# Patient Record
(demographics unavailable — no encounter records)

---

## 2024-11-06 NOTE — HISTORY OF PRESENT ILLNESS
[FreeTextEntry1] : I saw this patient in the office today.  As you recall, he is status post injury on 2/4/21. He was working and had parked his car in the parking lot. He was walking toward the door when he slipped on ice and hit the front of his head. He also injured his left arm.  He was ultimately taken to [facility] (formally known as [place]) where he was evaluated and ultimately discharge. Ever since then he has had headaches. He also has balance difficulty. He now reports that the headaches are occurring about twice per week. The balance difficulty is constant. He had been doing physical therapy twice per week.  He returned to work on 7/20/21.  11/6/2024 visit: He continues to have headaches on the order of every other day. He also reports that he continues to have difficulty with his left arm.

## 2024-11-06 NOTE — DATA REVIEWED
[de-identified] : Brain MRI was performed on 7/17/2022 at [facility].\par  The study was unremarkable. [de-identified] : CT scan of the head was performed at [facility] on 2/4/21. The study demonstrated some soft tissue contusion with moderate soft tissue scalp hematoma in the right frontal region with no underlying fracture or intracranial hemorrhage.  Cervical spine CT was performed on the same day at the same institution. The study demonstrated no sign of fracture or traumatic malalignment. There was some degenerative disc disease noted.  Thoracic and lumbar spine CT was performed on the same date at the same institution. Again no acute fractures were seen. 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 There was some chronic degenerative change.  EMG and nerve conduction studies of the left upper extremity were performed on 2/7/2023. The study demonstrated left C8 radiculopathy.   There was no evidence of median or ulnar nerve entrapment/compression.

## 2024-11-06 NOTE — CONSULT LETTER
[Dear  ___] : Dear  [unfilled], [Courtesy Letter:] : I had the pleasure of seeing your patient, [unfilled], in my office today. [Please see my note below.] : Please see my note below. [Consult Closing:] : Thank you very much for allowing me to participate in the care of this patient.  If you have any questions, please do not hesitate to contact me. [Sincerely,] : Sincerely, [FreeTextEntry3] : Toby Nelson MD.

## 2024-11-06 NOTE — ASSESSMENT
[FreeTextEntry1] : This is a 62-year-old man who is status post injury on 2/4/21. He sustained a head injury and has had residual headache and balance difficulty. Brain imaging was unremarkable. He was having some positional vertigo that improved. In addition, he feels a "bump" in the area where he had the previous hematoma. I had explained that there are no medications that improve balance. I had given him an instruction sheet for some simple exercises he can do at home.  EMG and nerve conduction studies of the left upper extremity were performed on 2/7/2023. The study demonstrated left C8 radiculopathy.  There was no evidence of median or ulnar nerve entrapment/compression. He had been following with an orthopedist. He reports that he completed physical therapy.   My feeling is that the residual symptoms from his head (headache and dizziness) are permanent. Given the motor weakness and sensory loss in the left upper extremity, I would estimate the left elbow injury has resulted in 40% permanent damage. It is also my feeling that his left arm difficulties will be permanent.  Given the persisting symptoms in the left arm, I would recommend he follow-up with the orthopedist again. I would defer comment regarding the neck and back to his orthopedist.

## 2024-11-06 NOTE — REASON FOR VISIT
[Follow-Up: _____] : a [unfilled] follow-up visit [FreeTextEntry1] : CC: headache and balance difficulty after injury

## 2024-11-06 NOTE — PHYSICAL EXAM
[General Appearance - Alert] : alert [General Appearance - In No Acute Distress] : in no acute distress [Oriented To Time, Place, And Person] : oriented to person, place, and time [Affect] : the affect was normal [Memory Recent] : recent memory was not impaired [Memory Remote] : remote memory was not impaired [Cranial Nerves Optic (II)] : visual acuity intact bilaterally,  visual fields full to confrontation, pupils equal round and reactive to light [Cranial Nerves Oculomotor (III)] : extraocular motion intact [Cranial Nerves Trigeminal (V)] : facial sensation intact symmetrically [Cranial Nerves Facial (VII)] : face symmetrical [Cranial Nerves Vestibulocochlear (VIII)] : hearing was intact bilaterally [Cranial Nerves Glossopharyngeal (IX)] : tongue and palate midline [Cranial Nerves Accessory (XI - Cranial And Spinal)] : head turning and shoulder shrug symmetric [Cranial Nerves Hypoglossal (XII)] : there was no tongue deviation with protrusion [Motor Tone] : muscle tone was normal in all four extremities [Sensation Tactile Decrease] : light touch was intact [Sensation Vibration Decrease] : vibration was intact [Abnormal Walk] : normal gait [Tremor] : a tremor present [2+] : Patella left 2+ [Optic Disc Abnormality] : the optic disc were normal in size and color [Dysarthria] : no dysarthria [Aphasia] : no dysphasia/aphasia [Romberg's Sign] : Romberg's sign was negtive [Coordination - Dysmetria Impaired Finger-to-Nose Bilateral] : not present [Plantar Reflex Right Only] : normal on the right [Plantar Reflex Left Only] : normal on the left [FreeTextEntry6] : The left arm is somewhat limited by pain.

## 2025-05-09 NOTE — DATA REVIEWED
[de-identified] : Brain MRI was performed on 7/17/2022 at [facility].\par  The study was unremarkable. [de-identified] : CT scan of the head was performed at [facility] on 2/4/21. The study demonstrated some soft tissue contusion with moderate soft tissue scalp hematoma in the right frontal region with no underlying fracture or intracranial hemorrhage.  Cervical spine CT was performed on the same day at the same institution. The study demonstrated no sign of fracture or traumatic malalignment. There was some degenerative disc disease noted.  Thoracic and lumbar spine CT was performed on the same date at the same institution. Again no acute fractures were seen. 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 There was some chronic degenerative change.  EMG and nerve conduction studies of the left upper extremity were performed on 2/7/2023. The study demonstrated left C8 radiculopathy.   There was no evidence of median or ulnar nerve entrapment/compression.

## 2025-05-09 NOTE — ASSESSMENT
[FreeTextEntry1] : This is a 62-year-old man who is status post injury on 2/4/21. He sustained a head injury and has had residual headache and balance difficulty. Brain imaging was unremarkable. He was having some positional vertigo that improved. In addition, he feels a "bump" in the area where he had the previous hematoma. I had explained that there are no medications that improve balance. I had given him an instruction sheet for some simple exercises he can do at home.  EMG and nerve conduction studies of the left upper extremity were performed on 2/7/2023. The study demonstrated left C8 radiculopathy.  There was no evidence of median or ulnar nerve entrapment/compression. He had been following with an orthopedist. He reports that he completed physical therapy.  Given the persisting symptoms in the left arm, I would recommend he follow-up with the orthopedist again. I would defer comment regarding the neck and back to his orthopedist.  My feeling is that the residual symptoms from his head (headache and dizziness) are permanent. Given the motor weakness and sensory loss in the left upper extremity, I would estimate the left elbow injury has resulted in 40% permanent damage. It is also my feeling that his left arm difficulties will be permanent.  I again discussed the option of preventive medication with the patient. I have suggested a trial of topiramate starting at 50 mg at bedtime. Hopefully this will improve his headache frequency and intensity.

## 2025-05-09 NOTE — HISTORY OF PRESENT ILLNESS
[FreeTextEntry1] : I saw this patient in the office today.  As you recall, he is status post injury on 2/4/21. He was working and had parked his car in the parking lot. He was walking toward the door when he slipped on ice and hit the front of his head. He also injured his left arm.  He was ultimately taken to [facility] (formally known as [place]) where he was evaluated and ultimately discharge. Ever since then he has had headaches. He also has balance difficulty. He now reports that the headaches are occurring about twice per week. The balance difficulty is constant. He had been doing physical therapy twice per week.  He returned to work on 7/20/21.  5/9/25 visit: He continues to have headaches on the order of every other day. He also reports that he continues to have difficulty with his left arm.